# Patient Record
Sex: FEMALE | Race: WHITE
[De-identification: names, ages, dates, MRNs, and addresses within clinical notes are randomized per-mention and may not be internally consistent; named-entity substitution may affect disease eponyms.]

---

## 2023-01-27 ENCOUNTER — HOSPITAL ENCOUNTER (OUTPATIENT)
Dept: HOSPITAL 4 - SMU | Age: 83
Discharge: HOME | End: 2023-01-27
Attending: SURGERY
Payer: COMMERCIAL

## 2023-01-27 VITALS — WEIGHT: 168 LBS | HEIGHT: 64 IN | BODY MASS INDEX: 28.68 KG/M2

## 2023-01-27 VITALS — SYSTOLIC BLOOD PRESSURE: 139 MMHG

## 2023-01-27 DIAGNOSIS — Z79.899: ICD-10-CM

## 2023-01-27 DIAGNOSIS — M19.90: ICD-10-CM

## 2023-01-27 DIAGNOSIS — F33.1: ICD-10-CM

## 2023-01-27 DIAGNOSIS — Z20.822: ICD-10-CM

## 2023-01-27 DIAGNOSIS — K21.9: ICD-10-CM

## 2023-01-27 DIAGNOSIS — K80.10: Primary | ICD-10-CM

## 2023-01-27 PROCEDURE — U0003 INFECTIOUS AGENT DETECTION BY NUCLEIC ACID (DNA OR RNA); SEVERE ACUTE RESPIRATORY SYNDROME CORONAVIRUS 2 (SARS-COV-2) (CORONAVIRUS DISEASE [COVID-19]), AMPLIFIED PROBE TECHNIQUE, MAKING USE OF HIGH THROUGHPUT TECHNOLOGIES AS DESCRIBED BY CMS-2020-01-R: HCPCS

## 2023-01-27 PROCEDURE — C1758 CATHETER, URETERAL: HCPCS

## 2023-01-27 PROCEDURE — 88304 TISSUE EXAM BY PATHOLOGIST: CPT

## 2023-01-27 PROCEDURE — 74300 X-RAY BILE DUCTS/PANCREAS: CPT

## 2023-01-27 PROCEDURE — 47563 LAPARO CHOLECYSTECTOMY/GRAPH: CPT

## 2023-01-27 PROCEDURE — 36415 COLL VENOUS BLD VENIPUNCTURE: CPT

## 2023-01-27 PROCEDURE — C1727 CATH, BAL TIS DIS, NON-VAS: HCPCS

## 2023-01-27 RX ADMIN — MIDAZOLAM HYDROCHLORIDE ONE MG: 1 INJECTION, SOLUTION INTRAMUSCULAR; INTRAVENOUS at 07:31

## 2023-01-27 RX ADMIN — MIDAZOLAM HYDROCHLORIDE ONE MG: 1 INJECTION, SOLUTION INTRAMUSCULAR; INTRAVENOUS at 07:11

## 2023-07-24 ENCOUNTER — HOSPITAL ENCOUNTER (EMERGENCY)
Dept: HOSPITAL 4 - SED | Age: 83
LOS: 1 days | Discharge: HOME | End: 2023-07-25
Payer: COMMERCIAL

## 2023-07-24 VITALS — HEIGHT: 64 IN | WEIGHT: 160 LBS | BODY MASS INDEX: 27.31 KG/M2

## 2023-07-24 VITALS
TEMPERATURE: 97.8 F | OXYGEN SATURATION: 95 % | RESPIRATION RATE: 17 BRPM | HEART RATE: 72 BPM | SYSTOLIC BLOOD PRESSURE: 180 MMHG
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
## 2023-07-24 DIAGNOSIS — Z79.899: ICD-10-CM

## 2023-07-24 DIAGNOSIS — S90.32XA: Primary | ICD-10-CM
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
## 2023-07-24 PROCEDURE — 96372 THER/PROPH/DIAG INJ SC/IM: CPT

## 2023-07-24 PROCEDURE — 73630 X-RAY EXAM OF FOOT: CPT

## 2023-07-24 PROCEDURE — 99283 EMERGENCY DEPT VISIT LOW MDM: CPT

## 2023-07-24 NOTE — NUR
Patient triaged and placed in waiting room. VS checked and patient appears in 
no acute distress at this time. Accompanied by family , awaiting available bed, 
and MD notified of need for MSE.

## 2023-07-25 VITALS
HEART RATE: 72 BPM | TEMPERATURE: 97.8 F | RESPIRATION RATE: 17 BRPM | OXYGEN SATURATION: 95 % | SYSTOLIC BLOOD PRESSURE: 191 MMHG

## 2023-07-25 NOTE — NUR
PT BIB  FROM HOME C/O LT FOOT PAIN X3DAYS.  PT STATES PAIN HAS BECOME 
10/10 WHEN WALKING PAIN IS THROBBING AND DOES NOT RADIATE.  PT STATES TAKES 
IBUPROFEN 800 FOR PAIN BUT DID NOT HELP.  PT STATES NO PAIN WHEN RESTING.  PT 
DENIES OTHER HEALTH HX. PT IS AAO X4 RESTING IN BED  BEDSIDE

## 2023-07-25 NOTE — NUR
Patient given written and verbal discharge instructions and verbalizes 
understanding.  ER MD discussed with patient the results and treatment 
provided. Patient in stable condition. ID arm band removed. 

Rx of NORCO AND IBUPROFEN given. Patient educated on FOOT CONTUSION and to 
follow up with PMD. Pain Scale .

Opportunity for questions provided and answered. Medication side effect fact 
sheet provided.